# Patient Record
Sex: FEMALE
[De-identification: names, ages, dates, MRNs, and addresses within clinical notes are randomized per-mention and may not be internally consistent; named-entity substitution may affect disease eponyms.]

---

## 2018-01-25 ENCOUNTER — HOSPITAL ENCOUNTER (OUTPATIENT)
Dept: HOSPITAL 5 - GIO | Age: 33
Discharge: HOME | End: 2018-01-25
Attending: SURGERY
Payer: MEDICAID

## 2018-01-25 VITALS — SYSTOLIC BLOOD PRESSURE: 128 MMHG | DIASTOLIC BLOOD PRESSURE: 83 MMHG

## 2018-01-25 DIAGNOSIS — Z88.6: ICD-10-CM

## 2018-01-25 DIAGNOSIS — Z98.84: ICD-10-CM

## 2018-01-25 DIAGNOSIS — K44.9: Primary | ICD-10-CM

## 2018-01-25 DIAGNOSIS — E66.01: ICD-10-CM

## 2018-01-25 PROCEDURE — 43235 EGD DIAGNOSTIC BRUSH WASH: CPT

## 2018-01-25 PROCEDURE — 81025 URINE PREGNANCY TEST: CPT

## 2018-01-25 NOTE — ANESTHESIA CONSULTATION
Anesthesia Consult and Med Hx


Date of service: 01/25/18





- Airway


Anesthetic Teeth Evaluation: Good


ROM Head & Neck: Adequate


Mental/Hyoid Distance: Adequate


Mallampati Class: Class II


Intubation Access Assessment: Good





- Pulmonary Exam


CTA: Yes





- Cardiac Exam


Cardiac Exam: RRR





- Pre-Operative Health Status


ASA Pre-Surgery Classification: ASA3


Proposed Anesthetic Plan: MAC





- Other Systems


Hx Obesity: Yes

## 2018-01-25 NOTE — DISCHARGE SUMMARY
Providers





- Providers


Date of discharge: 01/25/18


Attending physician: 


NORMA DUDLEY








Hospitalization


Condition: Good


Procedures: 





egd


Hospital course: 





pt had an uneventful egd as part of pre-op planning for upcoming bariatric 

surgery


Disposition: DC-01 TO HOME OR SELFCARE





Core Measure Documentation





- Palliative Care


Palliative Care/ Comfort Measures: Not Applicable





- Core Measures


Any of the following diagnoses?: none





Exam





- Physical Exam


Narrative exam: 





unchanged from pre-op





Plan


Activity: no restrictions


Weight Bearing Status: Full Weight Bearing


Diet: regular


Follow up with: 


JENNI DE LEÓN NP [Other] - 7 Days

## 2018-01-25 NOTE — OPERATIVE REPORT
Operative Report


Operative Report: 





OPERATIVE REPORT - EGD





DATE 1/25/18





SURGERY:  Upper endoscopy.





SURGEON:  Adam Talley M.D.





ASSISTANT: Belia Murdock DO





PRE OP DX: dyspepsia





POST OP DX: hiatal hernia





TYPE OF ANESTHESIA:  MAC.





ESTIMATED BLOOD LOSS:  None.





COMPLICATIONS:  None.





SPECIMENS REMOVED:  None.





FINDINGS:


1.  Small hiatal hernia.


2.  Otherwise, normal esophagus, stomach and first portion of duodenum.





INDICATIONS:INDICATION FOR PROCEDURE:  Patient is a 32-year-old female with a 

long history of morbid obesity.  She is planned to have a weight loss procedure 

and is here for preoperative planning EGD.





PROCEDURE DETAILS: After consent was reviewed, patient was taken back to


the operating room where patient was placed in the left lateral decubitus


position and a bite block was placed in the mouth.  After a time-out was


called, MAC anesthesia was initiated.  I then passed the endoscope into her


oropharynx, into her esophagus, visualized the entire esophagus, which was all


within normal limits.  I then visualized the stomach and the first portion of


the duodenum and there were no abnormalities I could clearly visualize.  I


then retroflexed the scope in the stomach and visualized the hiatus and I


could see a small hiatal hernia.  I then desufflated the stomach and


removed the endoscope.  Patient tolerated procedure well and was transferred


to recovery room in good and stable condition.

## 2018-04-07 ENCOUNTER — HOSPITAL ENCOUNTER (EMERGENCY)
Dept: HOSPITAL 5 - ED | Age: 33
LOS: 1 days | Discharge: HOME | End: 2018-04-08
Payer: MEDICAID

## 2018-04-07 ENCOUNTER — HOSPITAL ENCOUNTER (EMERGENCY)
Dept: HOSPITAL 5 - ED | Age: 33
Discharge: LEFT BEFORE DISCHARGE | End: 2018-04-07
Payer: MEDICAID

## 2018-04-07 DIAGNOSIS — R10.9: Primary | ICD-10-CM

## 2018-04-07 DIAGNOSIS — G43.909: ICD-10-CM

## 2018-04-07 DIAGNOSIS — Z90.49: ICD-10-CM

## 2018-04-07 DIAGNOSIS — Z98.84: ICD-10-CM

## 2018-04-07 DIAGNOSIS — Z53.21: ICD-10-CM

## 2018-04-07 DIAGNOSIS — J45.909: ICD-10-CM

## 2018-04-07 DIAGNOSIS — M19.90: ICD-10-CM

## 2018-04-07 DIAGNOSIS — Z88.5: ICD-10-CM

## 2018-04-07 LAB
ALBUMIN SERPL-MCNC: 4 G/DL (ref 3.9–5)
ALT SERPL-CCNC: 17 UNITS/L (ref 7–56)
BACTERIA #/AREA URNS HPF: (no result) /HPF
BASOPHILS # (AUTO): 0.1 K/MM3 (ref 0–0.1)
BASOPHILS NFR BLD AUTO: 0.5 % (ref 0–1.8)
BILIRUB DIRECT SERPL-MCNC: < 0.2 MG/DL (ref 0–0.2)
BILIRUB UR QL STRIP: (no result)
BLOOD UR QL VISUAL: (no result)
BUN SERPL-MCNC: 9 MG/DL (ref 7–17)
BUN/CREAT SERPL: 15 %
CALCIUM SERPL-MCNC: 8.9 MG/DL (ref 8.4–10.2)
EOSINOPHIL # BLD AUTO: 1.1 K/MM3 (ref 0–0.4)
EOSINOPHIL NFR BLD AUTO: 8 % (ref 0–4.3)
HCT VFR BLD CALC: 40.1 % (ref 30.3–42.9)
HEMOLYSIS INDEX: 9
HGB BLD-MCNC: 13.1 GM/DL (ref 10.1–14.3)
LYMPHOCYTES # BLD AUTO: 2.1 K/MM3 (ref 1.2–5.4)
LYMPHOCYTES NFR BLD AUTO: 16 % (ref 13.4–35)
MCH RBC QN AUTO: 28 PG (ref 28–32)
MCHC RBC AUTO-ENTMCNC: 33 % (ref 30–34)
MCV RBC AUTO: 86 FL (ref 79–97)
MONOCYTES # (AUTO): 0.5 K/MM3 (ref 0–0.8)
MONOCYTES % (AUTO): 3.9 % (ref 0–7.3)
MUCOUS THREADS #/AREA URNS HPF: (no result) /HPF
PH UR STRIP: 6 [PH] (ref 5–7)
PLATELET # BLD: 365 K/MM3 (ref 140–440)
RBC # BLD AUTO: 4.67 M/MM3 (ref 3.65–5.03)
RBC #/AREA URNS HPF: 8 /HPF (ref 0–6)
UROBILINOGEN UR-MCNC: < 2 MG/DL (ref ?–2)
WBC #/AREA URNS HPF: 14 /HPF (ref 0–6)

## 2018-04-07 PROCEDURE — 83690 ASSAY OF LIPASE: CPT

## 2018-04-07 PROCEDURE — 81025 URINE PREGNANCY TEST: CPT

## 2018-04-07 PROCEDURE — 85025 COMPLETE CBC W/AUTO DIFF WBC: CPT

## 2018-04-07 PROCEDURE — 74177 CT ABD & PELVIS W/CONTRAST: CPT

## 2018-04-07 PROCEDURE — 80074 ACUTE HEPATITIS PANEL: CPT

## 2018-04-07 PROCEDURE — 99284 EMERGENCY DEPT VISIT MOD MDM: CPT

## 2018-04-07 PROCEDURE — 96360 HYDRATION IV INFUSION INIT: CPT

## 2018-04-07 PROCEDURE — 80048 BASIC METABOLIC PNL TOTAL CA: CPT

## 2018-04-07 PROCEDURE — 36415 COLL VENOUS BLD VENIPUNCTURE: CPT

## 2018-04-07 PROCEDURE — 81001 URINALYSIS AUTO W/SCOPE: CPT

## 2018-04-08 VITALS — SYSTOLIC BLOOD PRESSURE: 111 MMHG | DIASTOLIC BLOOD PRESSURE: 67 MMHG

## 2018-04-08 NOTE — EMERGENCY DEPARTMENT REPORT
ED Abdominal Pain HPI





- General


Chief Complaint: Abdominal Pain


Stated Complaint: RIGHT SIDE ABDOMINAL SWELLING


Time Seen by Provider: 04/07/18 21:10


Source: patient


Mode of arrival: Ambulatory


Limitations: No Limitations





- History of Present Illness


Initial Comments: 





Patient is a 33-year-old  female who is presenting status post 

bariatric surgery because of a lump that she feels in the right upper quadrant.

  Patient noticed this today at approximately 1 PM.  Patient had her postop 

visit yesterday with Dr. Keith.  Patient is 7 week status post bariatric 

surgery.  Patient states this area of swelling is comfortable and rates the 

discomfort as 4 out of 10 in severity.  Patient states she hasn't had to take 

much of her pain medicines secondary to the fact she has a high pain tolerance 

but she is worried about this area swelling since it is not associated with one 

of her wounds from her laparoscopic surgery.  She denies any nausea vomiting 

fever at this time.


Severity scale (0 -10): 5





- Related Data


 Home Medications











 Medication  Instructions  Recorded  Confirmed  Last Taken


 


Levothyroxine Sodium [Synthroid] 175 mcg PO QDAY 03/27/18 04/03/18 04/02/18


 


Maxalt 1 tab PO DAILY PRN 03/27/18 04/03/18 2 Weeks Ago





    ~03/20/18


 


clonazePAM [Klonopin] 1 mg PO DAILY PRN 03/27/18 04/03/18 04/03/18 07:50











 Allergies











Allergy/AdvReac Type Severity Reaction Status Date / Time


 


codeine Allergy  Vomiting,ITCHING, Verified 03/27/18 13:44





   DIZZINESS  














ED Review of Systems


ROS: 


Stated complaint: RIGHT SIDE ABDOMINAL SWELLING


Other details as noted in HPI





Comment: All other systems reviewed and negative





ED Past Medical Hx





- Past Medical History


Hx Arthritis: Yes


Hx Headaches / Migraines: Yes


Hx Asthma: Yes (seasonal, inhaler use last fall )


Hx HIV: No





- Surgical History


Hx Cholecystectomy: Yes


Additional Surgical History: gatrsic bypass surgery 4/3/18 @ [facility] by Dr. Deangelo Keith





- Social History


Smoking Status: Never Smoker


Substance Use Type: None





- Medications


Home Medications: 


 Home Medications











 Medication  Instructions  Recorded  Confirmed  Last Taken  Type


 


Levothyroxine Sodium [Synthroid] 175 mcg PO QDAY 03/27/18 04/03/18 04/02/18 

History


 


Maxalt 1 tab PO DAILY PRN 03/27/18 04/03/18 2 Weeks Ago History





    ~03/20/18 


 


clonazePAM [Klonopin] 1 mg PO DAILY PRN 03/27/18 04/03/18 04/03/18 07:50 History














ED Physical Exam





- General


Limitations: No Limitations


General appearance: alert, in no apparent distress





- Head


Head exam: Present: atraumatic, normocephalic





- Eye


Eye exam: Present: normal appearance





- ENT


ENT exam: Present: mucous membranes moist





- Neck


Neck exam: Present: normal inspection





- Respiratory


Respiratory exam: Present: normal lung sounds bilaterally.  Absent: respiratory 

distress, wheezes, rales





- Cardiovascular


Cardiovascular Exam: Present: regular rate, normal rhythm.  Absent: systolic 

murmur, diastolic murmur, rubs, gallop





- GI/Abdominal


GI/Abdominal exam: Present: soft, tenderness, normal bowel sounds, other (

patient has some mild diffuse tenderness.  Her wounds are dressed and clean.  

Patient does have some increase Saturday swelling in the right upper quadrant.)

.  Absent: guarding, rebound, rigid





- Extremities Exam


Extremities exam: Present: normal inspection





- Back Exam


Back exam: Present: normal inspection





- Neurological Exam


Neurological exam: Present: alert, oriented X3





- Psychiatric


Psychiatric exam: Present: normal affect, normal mood





- Skin


Skin exam: Present: warm, dry, intact, normal color.  Absent: rash





ED Course





 Vital Signs











  04/07/18 04/07/18





  16:17 17:50


 


Temperature 98.3 F 98.2 F


 


Pulse Rate 94 H 95 H


 


Respiratory 18 20





Rate  


 


Blood Pressure 127/82 139/91


 


O2 Sat by Pulse 98 





Oximetry  














ED Medical Decision Making





- Lab Data


Result diagrams: 


 04/07/18 16:59





 04/07/18 16:59





- Radiology Data


Radiology results: report reviewed





DT abdomen and pelvis shows normal postsurgical changes.  There is no hematoma 

in the area of swelling.


Critical care attestation.: 


If time is entered above; I have spent that time in minutes in the direct care 

of this critically ill patient, excluding procedure time.








ED Disposition


Clinical Impression: 


Abdominal pain


Qualifiers:


 Abdominal location: unspecified location Qualified Code(s): R10.9 - 

Unspecified abdominal pain





Disposition: DC-01 TO HOME OR SELFCARE


Is pt being admited?: No


Does the pt Need Aspirin: No


Condition: Stable


Instructions:  Abdominal Pain (ED)


Referrals: 


MARIE HALEY MD [Primary Care Provider] - 3-5 Days

## 2018-04-08 NOTE — CAT SCAN REPORT
FINAL REPORT



EXAM:  CT ABDOMEN PELVIS W CON



HISTORY:  swelling to RUQ 



TECHNIQUE:  Dynamic helical CT scan through the abdomen and

pelvis during and again after intravenous injection of iodinated

contrast. Images are reconstructed in the sagittal and coronal

planes.



PRIORS:  None.



FINDINGS:  

The lung bases are clear.



The liver, pancreas, spleen and adrenal glands appear normal.

There are surgical clips in the gallbladder fossa.



The kidneys appear normal. 



The uterus and ovaries appear grossly normal.



There are surgical clips in the stomach. 



There are no abnormally dilated loops of bowel or acute

inflammatory changes. There are bowel anastomotic staples at the

level of the proximal jejunum. A normal-appearing appendix is

identified.



The abdominal aorta has a normal diameter.



The bones and subcutaneous soft tissues are unremarkable for age.



IMPRESSION:  

1. Postsurgical changes of the stomach and proximal jejunum.

Correlation with surgical history is recommended. 



2. No acute findings in the abdomen/pelvis

## 2020-09-28 ENCOUNTER — WEB ENCOUNTER (OUTPATIENT)
Dept: URBAN - METROPOLITAN AREA CLINIC 70 | Facility: CLINIC | Age: 35
End: 2020-09-28

## 2020-09-28 ENCOUNTER — OFFICE VISIT (OUTPATIENT)
Dept: URBAN - METROPOLITAN AREA CLINIC 70 | Facility: CLINIC | Age: 35
End: 2020-09-28
Payer: MEDICAID

## 2020-09-28 ENCOUNTER — DASHBOARD ENCOUNTERS (OUTPATIENT)
Age: 35
End: 2020-09-28

## 2020-09-28 ENCOUNTER — LAB OUTSIDE AN ENCOUNTER (OUTPATIENT)
Dept: URBAN - METROPOLITAN AREA CLINIC 70 | Facility: CLINIC | Age: 35
End: 2020-09-28

## 2020-09-28 DIAGNOSIS — R63.4 ABNORMAL WEIGHT LOSS: ICD-10-CM

## 2020-09-28 DIAGNOSIS — R10.31 RLQ ABDOMINAL PAIN: ICD-10-CM

## 2020-09-28 DIAGNOSIS — K92.1 HEMATOCHEZIA: ICD-10-CM

## 2020-09-28 DIAGNOSIS — R10.816 EPIGASTRIC ABDOMINAL TENDERNESS: ICD-10-CM

## 2020-09-28 DIAGNOSIS — R19.7 DIARRHEA: ICD-10-CM

## 2020-09-28 PROBLEM — 267024001 ABNORMAL WEIGHT LOSS: Status: ACTIVE | Noted: 2020-09-28

## 2020-09-28 PROBLEM — 405729008 HEMATOCHEZIA: Status: ACTIVE | Noted: 2020-09-28

## 2020-09-28 PROBLEM — 62315008 DIARRHEA: Status: ACTIVE | Noted: 2020-09-28

## 2020-09-28 PROBLEM — 301754002 RIGHT LOWER QUADRANT PAIN: Status: ACTIVE | Noted: 2020-09-28

## 2020-09-28 PROBLEM — 301403003 TENDERNESS OF EPIGASTRIUM: Status: ACTIVE | Noted: 2020-09-28

## 2020-09-28 PROCEDURE — G8427 DOCREV CUR MEDS BY ELIG CLIN: HCPCS | Performed by: NURSE PRACTITIONER

## 2020-09-28 PROCEDURE — 99213 OFFICE O/P EST LOW 20 MIN: CPT | Performed by: NURSE PRACTITIONER

## 2020-09-28 PROCEDURE — 87329 GIARDIA AG IA: CPT | Performed by: NURSE PRACTITIONER

## 2020-09-28 RX ORDER — SOY PROTEIN
POWDER (GRAM) ORAL
Qty: 0 | Refills: 0 | Status: ACTIVE | COMMUNITY
Start: 1900-01-01

## 2020-09-28 RX ORDER — DICYCLOMINE HYDROCHLORIDE 10 MG/1
1 TABLET CAPSULE ORAL THREE TIMES A DAY
Status: ACTIVE | COMMUNITY

## 2020-09-28 RX ORDER — LEVOTHYROXINE SODIUM 150 UG/1
TABLET ORAL
Qty: 0 | Refills: 0 | Status: ACTIVE | COMMUNITY
Start: 1900-01-01

## 2020-09-28 RX ORDER — RIZATRIPTAN BENZOATE 10 MG
TAKE 1 TABLET (10 MG) BY ORAL ROUTE ONCE, MAY REPEAT AT 2 HOUR INTERVALS; DO NOT EXCEED 30 MG IN 24 HOURS TABLET ORAL
Qty: 0 | Refills: 0 | Status: ACTIVE | COMMUNITY
Start: 1900-01-01

## 2020-09-28 RX ORDER — DEXTROAMPHETAMINE SACCHARATE, AMPHETAMINE ASPARTATE, DEXTROAMPHETAMINE SULFATE, AND AMPHETAMINE SULFATE 5; 5; 5; 5 MG/1; MG/1; MG/1; MG/1
TABLET ORAL
Qty: 0 | Refills: 0 | Status: ACTIVE | COMMUNITY
Start: 1900-01-01

## 2020-09-28 NOTE — PHYSICAL EXAM GASTROINTESTINAL
Abdomen , soft, mild generalized tenderness, nondistended , no guarding or rigidity , no masses palpable , normal bowel sounds , Liver and Spleen , no hepatomegaly present , no hepatosplenomegaly , liver nontender , spleen not palpable

## 2020-09-28 NOTE — HPI-TODAY'S VISIT:
Pt presents for colitis.  Pt presented to ED at [facility] 9/14/20 for RLQ and suprapubic pain.  Has hx of cholecystectomy 10yrs ago and gastric bypass in 2018. CT A/P in ED showed mild thickening of rectum vs underdistention.  Possible proctitis.  She was found to have a UTI. Labs in ED found WBC wnl, lipase wnl, LFT's wnl.  Hgb 13.3. Today she reports she has had ongoing lower abdominal pain intermittently for past 18 mo.  Reports diarrhea 2x daily, but has had loose BM's since gallbladder removal 10 yrs ago.  Reports abnormal weight loss of 11 lb in last 1 mo and has to drink Boost shakes to keep weight up.  Has seen [facility] intermittently on toilet tissue with last observed 1 week ago. She has never had an colonoscopy.  Had an EGD in 2018 in preparation for gastric bypass and hiatal hernia was found and repaired.  She has a hx significant for Lupus.

## 2020-09-29 ENCOUNTER — TELEPHONE ENCOUNTER (OUTPATIENT)
Dept: URBAN - METROPOLITAN AREA CLINIC 70 | Facility: CLINIC | Age: 35
End: 2020-09-29

## 2020-09-29 RX ORDER — POLYETHYLENE GLYCOL 3350, SODIUM SULFATE ANHYDROUS, SODIUM BICARBONATE, SODIUM CHLORIDE, POTASSIUM CHLORIDE 236; 22.74; 6.74; 5.86; 2.97 G/4L; G/4L; G/4L; G/4L; G/4L
AS DIRECTED POWDER, FOR SOLUTION ORAL 1
Qty: 1 | Refills: 0 | OUTPATIENT
Start: 2020-09-29 | End: 2020-09-30

## 2020-10-27 ENCOUNTER — OFFICE VISIT (OUTPATIENT)
Dept: URBAN - METROPOLITAN AREA SURGERY CENTER 24 | Facility: SURGERY CENTER | Age: 35
End: 2020-10-27
Payer: MEDICAID

## 2020-10-27 DIAGNOSIS — K63.89 BACTERIAL OVERGROWTH SYNDROME: ICD-10-CM

## 2020-10-27 DIAGNOSIS — R93.3 ABN FINDINGS-GI TRACT: ICD-10-CM

## 2020-10-27 DIAGNOSIS — R10.84 ABDOMINAL CRAMPING, GENERALIZED: ICD-10-CM

## 2020-10-27 DIAGNOSIS — R63.4 ABNORMAL INTENTIONAL WEIGHT LOSS: ICD-10-CM

## 2020-10-27 DIAGNOSIS — D12.4 ADENOMA OF DESCENDING COLON: ICD-10-CM

## 2020-10-27 DIAGNOSIS — R19.7 ACUTE DIARRHEA: ICD-10-CM

## 2020-10-27 DIAGNOSIS — K29.60 ADENOPAPILLOMATOSIS GASTRICA: ICD-10-CM

## 2020-10-27 PROCEDURE — 43239 EGD BIOPSY SINGLE/MULTIPLE: CPT | Performed by: INTERNAL MEDICINE

## 2020-10-27 PROCEDURE — 45380 COLONOSCOPY AND BIOPSY: CPT | Performed by: INTERNAL MEDICINE

## 2020-10-27 PROCEDURE — G8907 PT DOC NO EVENTS ON DISCHARG: HCPCS | Performed by: INTERNAL MEDICINE

## 2020-10-27 PROCEDURE — G9937 DIG OR SURV COLSCO: HCPCS | Performed by: INTERNAL MEDICINE

## 2020-10-27 PROCEDURE — 45385 COLONOSCOPY W/LESION REMOVAL: CPT | Performed by: INTERNAL MEDICINE

## 2020-10-27 RX ORDER — RIZATRIPTAN BENZOATE 10 MG
TAKE 1 TABLET (10 MG) BY ORAL ROUTE ONCE, MAY REPEAT AT 2 HOUR INTERVALS; DO NOT EXCEED 30 MG IN 24 HOURS TABLET ORAL
Qty: 0 | Refills: 0 | Status: ACTIVE | COMMUNITY
Start: 1900-01-01

## 2020-10-27 RX ORDER — SOY PROTEIN
POWDER (GRAM) ORAL
Qty: 0 | Refills: 0 | Status: ACTIVE | COMMUNITY
Start: 1900-01-01

## 2020-10-27 RX ORDER — DEXTROAMPHETAMINE SACCHARATE, AMPHETAMINE ASPARTATE, DEXTROAMPHETAMINE SULFATE, AND AMPHETAMINE SULFATE 5; 5; 5; 5 MG/1; MG/1; MG/1; MG/1
TABLET ORAL
Qty: 0 | Refills: 0 | Status: ACTIVE | COMMUNITY
Start: 1900-01-01

## 2020-10-27 RX ORDER — DICYCLOMINE HYDROCHLORIDE 10 MG/1
1 TABLET CAPSULE ORAL THREE TIMES A DAY
Status: ACTIVE | COMMUNITY

## 2020-10-27 RX ORDER — LEVOTHYROXINE SODIUM 150 UG/1
TABLET ORAL
Qty: 0 | Refills: 0 | Status: ACTIVE | COMMUNITY
Start: 1900-01-01

## 2020-11-05 ENCOUNTER — OFFICE VISIT (OUTPATIENT)
Dept: URBAN - METROPOLITAN AREA CLINIC 118 | Facility: CLINIC | Age: 35
End: 2020-11-05

## 2020-11-10 ENCOUNTER — TELEPHONE ENCOUNTER (OUTPATIENT)
Dept: URBAN - METROPOLITAN AREA CLINIC 70 | Facility: CLINIC | Age: 35
End: 2020-11-10

## 2020-11-10 RX ORDER — DICYCLOMINE HYDROCHLORIDE 10 MG/1
1 TABLET CAPSULE ORAL THREE TIMES A DAY
Qty: 90 TABLET | Refills: 3

## 2020-11-11 PROBLEM — 197125005: Status: ACTIVE | Noted: 2020-11-11
